# Patient Record
Sex: FEMALE | URBAN - METROPOLITAN AREA
[De-identification: names, ages, dates, MRNs, and addresses within clinical notes are randomized per-mention and may not be internally consistent; named-entity substitution may affect disease eponyms.]

---

## 2019-11-09 ENCOUNTER — OFFICE VISIT (OUTPATIENT)
Dept: URBAN - METROPOLITAN AREA CLINIC 22 | Facility: CLINIC | Age: 44
End: 2019-11-09
Payer: COMMERCIAL

## 2019-11-09 DIAGNOSIS — E11.9 TYPE 2 DIABETES MELLITUS W/O COMPLICATION: Primary | ICD-10-CM

## 2019-11-09 PROCEDURE — 99203 OFFICE O/P NEW LOW 30 MIN: CPT | Performed by: OPTOMETRIST

## 2019-11-09 PROCEDURE — 92004 COMPRE OPH EXAM NEW PT 1/>: CPT | Performed by: OPTOMETRIST

## 2019-11-09 ASSESSMENT — INTRAOCULAR PRESSURE
OS: 13
OD: 14

## 2019-11-09 NOTE — IMPRESSION/PLAN
Impression: Type 2 diabetes mellitus w/o complication: X43.5. Plan: Patient ed on importance of B. S control.

## 2021-01-06 ENCOUNTER — OFFICE VISIT (OUTPATIENT)
Dept: URBAN - METROPOLITAN AREA CLINIC 22 | Facility: CLINIC | Age: 46
End: 2021-01-06
Payer: MEDICARE

## 2021-01-06 DIAGNOSIS — H52.4 PRESBYOPIA: ICD-10-CM

## 2021-01-06 PROCEDURE — 99214 OFFICE O/P EST MOD 30 MIN: CPT | Performed by: OPTOMETRIST

## 2021-01-06 ASSESSMENT — VISUAL ACUITY
OD: 20/20
OS: 20/20

## 2021-01-06 ASSESSMENT — INTRAOCULAR PRESSURE
OS: 15
OD: 16

## 2021-01-06 NOTE — IMPRESSION/PLAN
Impression: Type 2 diabetes mellitus w/o complication: V75.9. Bilateral. Plan: RTC 1 year for [address]. Recommend continued BS monitoring and scheduled visits with PCP/ENDO.

## 2022-06-17 ENCOUNTER — OFFICE VISIT (OUTPATIENT)
Dept: URBAN - METROPOLITAN AREA CLINIC 22 | Facility: CLINIC | Age: 47
End: 2022-06-17
Payer: MEDICARE

## 2022-06-17 DIAGNOSIS — H10.45 OTHER CHRONIC ALLERGIC CONJUNCTIVITIS: ICD-10-CM

## 2022-06-17 DIAGNOSIS — E11.9 TYPE 2 DIABETES MELLITUS WITHOUT COMPLICATIONS: Primary | ICD-10-CM

## 2022-06-17 DIAGNOSIS — H04.123 DRY EYE SYNDROME OF BILATERAL LACRIMAL GLANDS: ICD-10-CM

## 2022-06-17 PROCEDURE — 92134 CPTRZ OPH DX IMG PST SGM RTA: CPT | Performed by: STUDENT IN AN ORGANIZED HEALTH CARE EDUCATION/TRAINING PROGRAM

## 2022-06-17 PROCEDURE — 92014 COMPRE OPH EXAM EST PT 1/>: CPT | Performed by: STUDENT IN AN ORGANIZED HEALTH CARE EDUCATION/TRAINING PROGRAM

## 2022-06-17 ASSESSMENT — INTRAOCULAR PRESSURE
OD: 14
OS: 16

## 2022-06-17 NOTE — IMPRESSION/PLAN
Impression: Dry eye syndrome of bilateral lacrimal glands: H04.123. Plan: Discussed findings and patient educated on condition. Rx preservative-free artificial tears QID OU, warm compresses, and lid scrubs.

## 2022-06-17 NOTE — IMPRESSION/PLAN
Impression: Type 2 diabetes mellitus without complications: Y30.3. Plan: Discussed findings, no retinopathy or macular edema OU. Patient educated on importance of well-controlled blood sugar/pressure, risk of vision loss from diabetic retinopathy, and dilated eye exams. Continue management with PCP.

## 2023-06-20 ENCOUNTER — OFFICE VISIT (OUTPATIENT)
Dept: URBAN - METROPOLITAN AREA CLINIC 22 | Facility: CLINIC | Age: 48
End: 2023-06-20
Payer: COMMERCIAL

## 2023-06-20 DIAGNOSIS — E11.9 TYPE 2 DIABETES MELLITUS WITHOUT COMPLICATIONS: Primary | ICD-10-CM

## 2023-06-20 DIAGNOSIS — H10.45 OTHER CHRONIC ALLERGIC CONJUNCTIVITIS: ICD-10-CM

## 2023-06-20 DIAGNOSIS — H04.123 DRY EYE SYNDROME OF BILATERAL LACRIMAL GLANDS: ICD-10-CM

## 2023-06-20 DIAGNOSIS — H52.4 PRESBYOPIA: ICD-10-CM

## 2023-06-20 DIAGNOSIS — Z79.4 LONG TERM (CURRENT) USE OF INSULIN: ICD-10-CM

## 2023-06-20 PROCEDURE — 92014 COMPRE OPH EXAM EST PT 1/>: CPT | Performed by: STUDENT IN AN ORGANIZED HEALTH CARE EDUCATION/TRAINING PROGRAM

## 2023-06-20 ASSESSMENT — VISUAL ACUITY
OS: 20/20
OD: 20/20

## 2023-06-20 ASSESSMENT — INTRAOCULAR PRESSURE
OS: 16
OD: 16

## 2023-06-20 NOTE — IMPRESSION/PLAN
Impression: Presbyopia: H52.4. Plan: Discussed findings. Patient reports good vision with OTC +1.25 readers. Continue. Contact clinic if vision changes.

## 2023-06-20 NOTE — IMPRESSION/PLAN
Impression: Type 2 diabetes mellitus without complications: H06.1. Plan: Discussed findings, no retinopathy or macular edema OU. Patient educated on importance of well-controlled blood sugar/pressure, risk of vision loss from diabetic retinopathy, and dilated eye exams. Continue management with PCP.

## 2023-06-20 NOTE — IMPRESSION/PLAN
Impression: Other chronic allergic conjunctivitis: H10.45. Plan: Discussed findings and educated patient on condition. Continue oral antihistamines. Sample of [address] given.

## 2024-01-18 NOTE — IMPRESSION/PLAN
January 18, 2024      [facility]  [address]  [facility] [address]  Phone: [phone]       Patient: Armen Waller   YOB: 2010  Date of Visit: 01/18/2024    To Whom It May Concern:    Catrachita Waller  was at [facility] on 01/18/2024. The patient may return to school on 1/18/24 with no restrictions. If you have any questions or concerns, or if I can be of further assistance, please do not hesitate to contact me.    Sincerely,    Sherrill Kaba, SMA      Bedside shift change report given to Dimas Odonnell (oncoming nurse) by Royer Garcia (offgoing nurse). Report included the following information SBAR, Kardex, MAR and Recent Results. Impression: Other chronic allergic conjunctivitis: H10.45. Plan: Discussed findings and educated patient on condition. Continue oral antihistamines. Rec'd Pataday QD (sample given).